# Patient Record
Sex: FEMALE | Race: WHITE | Employment: OTHER | URBAN - NONMETROPOLITAN AREA
[De-identification: names, ages, dates, MRNs, and addresses within clinical notes are randomized per-mention and may not be internally consistent; named-entity substitution may affect disease eponyms.]

---

## 2021-10-27 ENCOUNTER — OFFICE VISIT (OUTPATIENT)
Dept: ONCOLOGY | Age: 76
End: 2021-10-27
Payer: MEDICARE

## 2021-10-27 ENCOUNTER — HOSPITAL ENCOUNTER (OUTPATIENT)
Dept: INFUSION THERAPY | Age: 76
Discharge: HOME | End: 2021-10-27
Payer: MEDICARE

## 2021-10-27 VITALS
SYSTOLIC BLOOD PRESSURE: 141 MMHG | OXYGEN SATURATION: 98 % | DIASTOLIC BLOOD PRESSURE: 78 MMHG | HEART RATE: 94 BPM | TEMPERATURE: 99.4 F | BODY MASS INDEX: 33.39 KG/M2 | HEIGHT: 64 IN | RESPIRATION RATE: 20 BRPM | WEIGHT: 195.6 LBS

## 2021-10-27 DIAGNOSIS — C80.1 CARCINOMA (HCC): Primary | ICD-10-CM

## 2021-10-27 PROCEDURE — 1123F ACP DISCUSS/DSCN MKR DOCD: CPT | Performed by: INTERNAL MEDICINE

## 2021-10-27 PROCEDURE — 4040F PNEUMOC VAC/ADMIN/RCVD: CPT | Performed by: INTERNAL MEDICINE

## 2021-10-27 PROCEDURE — 99205 OFFICE O/P NEW HI 60 MIN: CPT | Performed by: INTERNAL MEDICINE

## 2021-10-27 PROCEDURE — 99211 OFF/OP EST MAY X REQ PHY/QHP: CPT

## 2021-10-27 PROCEDURE — 1036F TOBACCO NON-USER: CPT | Performed by: INTERNAL MEDICINE

## 2021-10-27 PROCEDURE — G8427 DOCREV CUR MEDS BY ELIG CLIN: HCPCS | Performed by: INTERNAL MEDICINE

## 2021-10-27 PROCEDURE — G8484 FLU IMMUNIZE NO ADMIN: HCPCS | Performed by: INTERNAL MEDICINE

## 2021-10-27 PROCEDURE — G8417 CALC BMI ABV UP PARAM F/U: HCPCS | Performed by: INTERNAL MEDICINE

## 2021-10-27 PROCEDURE — 1090F PRES/ABSN URINE INCON ASSESS: CPT | Performed by: INTERNAL MEDICINE

## 2021-10-27 PROCEDURE — G8400 PT W/DXA NO RESULTS DOC: HCPCS | Performed by: INTERNAL MEDICINE

## 2021-10-27 RX ORDER — CYCLOBENZAPRINE HCL 10 MG
10 TABLET ORAL NIGHTLY
COMMUNITY

## 2021-10-27 NOTE — PATIENT INSTRUCTIONS
1.  Please reconsult Dr. Claudette Reeves at [facility] for evaluation of surgical options for patient's malignancy. 2.  Please do ALK, EGFR, PD-L1, estrogen receptor, progesterone scepter, and HER-2/ashly on the patient's previous biopsy at [facility]. 3.  Foundation 1 analysis today. 4.  Return to clinic after the above have been completed.

## 2021-10-28 ENCOUNTER — TELEPHONE (OUTPATIENT)
Dept: ONCOLOGY | Age: 76
End: 2021-10-28

## 2021-11-09 ENCOUNTER — TELEPHONE (OUTPATIENT)
Dept: ONCOLOGY | Age: 76
End: 2021-11-09

## 2021-11-09 DIAGNOSIS — C80.1 CARCINOMA (HCC): Primary | ICD-10-CM

## 2021-11-12 NOTE — TELEPHONE ENCOUNTER
Schedule appointment to see radiation therapy and then see me after. Order for radiation therapy completed.

## 2021-11-22 NOTE — PROGRESS NOTES
[facility] CANCER CENTER  CANCER NETWORK OF [facility]  [facility] [address] [facility] [address]  [address], [facility] [address]  [address]  Dept: [phone]  Dept Fax: [phone]: [phone]     Encounter Date: 10/27/2021    Referring Physician:  Sylvia Lopez MD     Primary Provider: Addie Dias MD     Subjective:      Chief Complaint:  Lamar Travis is a 68 y.o. with lung cancer. HPI:  This is the first visit to the [facility] for this patient who was referred by Sylvia Lopez MD for evaluation of lung cancer. On September 10, 2021 the patient had a biopsy of a mass in the right upper lobe that was reported to be adenocarcinoma. In addition, a station 4R lymph node was also noted to be positive for malignancy with adenocarcinoma. The patient has had a PET/CT scan recently that confirmed the presence of the right upper lobe nodule abutting the mediastinum. There is also a right paratracheal lymph node concerning for malignancy on the PET/CT scan. The patient has previously met with Dr. Carmita Blanchard for evaluation and would like to meet with him again in the near future. I reviewed with the patient the role of chemotherapy treatment with the possibility of radiation therapy. Foundation 1 analysis as well as ALK, EGFR, and PD-L1 analysis will be completed on her malignancy. The patient is a chronic smoker with a history of heavy tobacco use. Multiple questions were answered throughout the course the consultation. By the end of the consultation, all the patient's questions had been answered. The patient was asked to call if there were any questions or concerns prior to the next scheduled clinic visit. Past Medical History  She  has a past medical history of Arthritis, Chronic back pain, Emphysema of lung ([address] and Hyperlipidemia.     Surgical History  She  has a past surgical history that includes Tonsillectomy; Foot surgery; and Cholecystectomy. Home Medications  She has a current medication list which includes the following prescription(s): cyclobenzaprine, calcium carbonate-vit d-min, senna, albuterol sulfate hfa, and simvastatin. Allergies  Allergies   Allergen Reactions    Motrin [Ibuprofen] Itching     Social History  She  reports that she has quit smoking. Her smoking use included cigarettes. She has a 78.00 pack-year smoking history. She has never used smokeless tobacco. She reports that she does not drink alcohol and does not use drugs. Family History  Her family history includes Asthma in her brother; Breast Cancer in her maternal grandmother; Cancer in an other family member; Diabetes in her mother, sister, and another family member; Heart Attack in her sister; Heart Disease in her sister and another family member; High Blood Pressure in an other family member. Review of Systems  Constitutional: Fatigue. HENT: Negative. Eyes: Negative. Respiratory: Cough, shortness of breath. Cardiovascular: Negative. Gastrointestinal: Negative. Genitourinary: Negative. Musculoskeletal: Muscle weakness, muscle pain, osteoporosis. Skin: Itching. Neurological: General weakness. Hematological: Negative. Psychiatric/Behavioral: Negative. Objective:   Physical Exam  Vitals:    10/27/21 1338   BP: (!) 141/78   Pulse: 94   Resp: 20   Temp: 99.4 °F (37.4 °C)   SpO2: 98%   Vitals reviewed and are stable. Constitutional: Elderly. No acute distress. HENT: Normocephalic and atraumatic. Eyes: Pupils appear equal. No scleral icterus. Neck: Bilateral appearance is symmetrical. No identifiable masses. Chest: Inspection and palpation of chest is normal.  Pulmonary: Effort normal. No respiratory distress. Cardiovascular: Nn edema in any of the four extremities. Abdominal: Soft. Bowel sounds present. No hepatomegaly or splenomegaly.    Musculoskeletal: Gait is abnormal. Muscle strength and tone grossly decreased. Neurological: Alert and oriented to person, place, and time. Judgment and thought content normal.  Skin: Scattered ecchymosis noted on bilateral upper forearms. Psychiatric: Mood and affect appropriate for the clinical situation. .     Assessment:   1. Adenocarcinoma of the lung. Plan:   1. Please re consult Dr. Lazaro Sky at [facility] for evaluation of surgical options for patient's malignancy. 2.  Please do ALK, EGFR, PD-L1, estrogen receptor, progesterone scepter, and HER-2/ashly on the patient's previous biopsy at [facility]. 3.  Foundation 1 analysis today. 4.  Return to clinic after the above have been completed. I had an sixty minute consultation with this patient and family members. The majority of this time was spent in direct face to face discussion regarding the diagnosis of malignancy, review of the radiographic studies, review of the surgical pathology, the overall prognosis and review of treatment options. Lilliam Norris M.D. Medical Director: [facility]  Cancer Network [address] [facility], [address] [place] [address], [address] [place] [address], [address] [place] [address] of the [facility] at the [facility]      **This report has been created using voice recognition software. It may contain minor errors which are inherent in voice recognition technology. **

## 2021-11-23 ENCOUNTER — HOSPITAL ENCOUNTER (OUTPATIENT)
Dept: RADIATION ONCOLOGY | Age: 76
Discharge: HOME | End: 2021-11-23

## 2021-11-23 ENCOUNTER — HOSPITAL ENCOUNTER (OUTPATIENT)
Dept: NUCLEAR MEDICINE | Age: 76
Discharge: HOME | End: 2021-11-23

## 2021-11-23 ENCOUNTER — TELEPHONE (OUTPATIENT)
Dept: ONCOLOGY | Age: 76
End: 2021-11-23

## 2021-11-23 VITALS
HEIGHT: 64 IN | OXYGEN SATURATION: 93 % | WEIGHT: 190.92 LBS | SYSTOLIC BLOOD PRESSURE: 136 MMHG | DIASTOLIC BLOOD PRESSURE: 78 MMHG | BODY MASS INDEX: 32.59 KG/M2 | HEART RATE: 113 BPM | TEMPERATURE: 97 F | RESPIRATION RATE: 20 BRPM

## 2021-11-23 DIAGNOSIS — C34.11 MALIGNANT NEOPLASM OF UPPER LOBE OF RIGHT LUNG (HCC): ICD-10-CM

## 2021-11-23 DIAGNOSIS — Z00.6 EXAMINATION FOR NORMAL COMPARISON FOR CLINICAL RESEARCH: ICD-10-CM

## 2021-11-23 PROCEDURE — 99204 OFFICE O/P NEW MOD 45 MIN: CPT | Performed by: RADIOLOGY

## 2021-11-23 NOTE — PROGRESS NOTES
Radiation Oncology Consultation  Encounter Date: 2021     Ms. Disha Mckeon is a 68 y.o. female  : 1945  MRN: [id number]  [facility]  Requesting Provider: Dr. Rosemarie Brian    Reason for request: Lung cancer      CONSULTANT: Maryana Yo MD      DIAGNOSIS: C34.11 -- Malignant neoplasm of upper lobe right bronchus or lung; Adenocarcinoma; T1b N2 M0, Stage IIIA [T1b based on size, could be considered T2 due to visceral pleural invasion]  Date of diagnosis: 9/10/2021      ASSESSMENT:  1. Stage IIIA non-small cell lung cancer as described above. The T-stage could be argued as T1b due to the size, but visceral pleural invasion would qualify as T2.  2. The diagnosis, including AJCC staging so far as it is currently understood was reviewed. Cheri received a copy of her AJCC staging information. Treatment options and recommendations, including current clinical practice guidelines (NCCN) were reviewed in detail. We reviewed recommendations for the initial diagnostic work-up, pathologic evaluation And treatment. For stage IIIA disease with N2 node involvement, the preferred management is definitive combined chemoradiotherapy. It is unclear whether Hilario Yadav will be a candidate for chemotherapy given her health limitations. I have some concern about her ability to lie on the treatment table because of her significant musculoskeletal pain, but she and I believe this is feasible if she is allowed to use a pad (thicker than the standard 1 week commonly used) on the simulator and treatment tables. We reviewed recommendations for surveillance/follow-up after completion of initial therapy. Hilario Yadav received a copy of the clinical practice guideline pertaining to her situation. 3. We reviewed various aspects of radiation therapy.   This included discussion of the nature/mode of action of external beam radiation therapy, multiple steps involved in set up/simulation, planning, initiation and performance of the treatment. We reviewed logistics including expected number of daily treatments and amount of time usually spent in the department for each treatment. Jorge Arzate has familiarity with radiation therapy, since her son is a radiation therapy technologist and indeed used to be the chief tach in this department. We reviewed expected and potential short and long-term side effects and risks of the treatment. The main acute risk is esophagitis with associated pain. Because this is a very centrally located cancer, the amount of pulmonary fibrosis after treatment should be minimal.  4. The etiology of Cheri's diffuse pain is unclear. She says the pain has been markedly more noticeable in the last 6 months. I am questioning whether there may be some paraneoplastic syndrome that is contributing. If so, then the best management is to treat the cancer. 11. Jorge Arzate had the opportunity to ask questions, and indicated that her questions were satisfactorily addressed. She also indicated that she understood the discussion. She does wish to proceed with radiation therapy. She is waiting to speak with Dr. Maranda Colorado regarding his recommendations about chemotherapy. PLAN:  1. Schedule CT simulation  2. Await Dr. Clifford Spurling' determination regarding systemic therapy  3. Schedule nursing teaching  4. Initiate RT after completion of therapy planning (in conjunction with systemic therapy if applicable  5. Continue care with other physicians/providers  6. Continue surveillance and basic/preventive/supportive health care in accordance with clinical practice guidelines. HISTORY OF PRESENT ILLNESS:   Emily Lim is a 49-year-old woman with a history of cigarette smoking and resultant COPD. She has been under surveillance for an abdominal aortic aneurism. In February 2021, there was incidental note of a mixed density lesion in the left hepatic lobe, felt likely to represent a hemangioma.   In April 2021, she presented with complaint of fever and abdominal pain. She underwent CT scanning Of the abdomen and pelvis which was stable. On work-up, she was found to have an elevated D-dimer. CT angiography of the thorax was obtained, And showed some infectious/inflammatory changes but also a 13 mm spiculated nodule in the medial right upper lobe. Because of her risk related to the smoking history, PET scan was recommended and was performed in April 2021 showed some mild uptake associated with the index lesion and some adjacent mediastinal lymphadenopathy. Initial bronchoscopic evaluation was nondiagnostic. Follow-up CT scan in July 2021 showed mild enlargement of the right upper lobe mass. Cheri then underwent additional evaluation. Bronchoscopy/EBUS was performed 9/10/2021 with biopsy of the lung nodule and a lymph node in the mediastinum at station 4R. Both of these returned positive for malignancy consistent with adenocarcinoma of lung origin. Follow-up PET scan in October 2021 again showed the concerning areas known to be cancer, with some increased in metabolic activity. Lu Pierre is not considered a suitable surgical candidate due to her overall performance status. She was referred to medical oncology for evaluation, and is being seen today 11/23/2021 for evaluation and discussion regarding the potential role of radiation therapy in the management of her stage III non-small cell lung cancer.       Past Medical History:   Diagnosis Date    Arthritis     Chronic back pain     Emphysema of lung ([address]     Hyperlipidemia         Past Surgical History:   Procedure Laterality Date    CHOLECYSTECTOMY      FOOT SURGERY      x4    TONSILLECTOMY         Family History   Problem Relation Age of Onset    Diabetes Other     High Blood Pressure Other     Heart Disease Other     Cancer Other     Diabetes Mother     Diabetes Sister     Heart Disease Sister     Heart Attack Sister     Breast Cancer Maternal Grandmother     Asthma Brother        Social History     Socioeconomic History    Marital status:      Spouse name: Not on file    Number of children: Not on file    Years of education: Not on file    Highest education level: Not on file   Occupational History    Not on file   Tobacco Use    Smoking status: Former Smoker     Packs/day: 1.50     Years: 52.00     Pack years: 78.00     Types: Cigarettes    Smokeless tobacco: Never Used   Substance and Sexual Activity    Alcohol use: No    Drug use: No    Sexual activity: Not on file   Other Topics Concern    Not on file   Social History Narrative    Not on file     Social Determinants of Health     Financial Resource Strain:     Difficulty of Paying Living Expenses: Not on file   Food Insecurity:     Worried About [address] in the Last Year: Not on file    [address] RxResults in the Last Year: Not on file   Transportation Needs:     Lack of Transportation (Medical): Not on file    Lack of Transportation (Non-Medical):  Not on file   Physical Activity:     Days of Exercise per Week: Not on file    Minutes of Exercise per Session: Not on file   Stress:     Feeling of Stress : Not on file   Social Connections:     Frequency of Communication with Friends and Family: Not on file    Frequency of Social Gatherings with Friends and Family: Not on file    Attends Anabaptist Services: Not on file    Active Member of [address] [place] [address] [employer] or Organizations: Not on file    Attends Club or Organization Meetings: Not on file    Marital Status: Not on file   Intimate Partner Violence:     Fear of Current or Ex-Partner: Not on file    Emotionally Abused: Not on file    Physically Abused: Not on file    Sexually Abused: Not on file   Housing Stability:     Unable to Pay for Housing in the Last Year: Not on file    Number of [place] in the Last Year: Not on file    Unstable Housing in the Last Year: Not on file     Exposure to Industrial/ environmental Carcinogens: no      ALLERGIES:   Allergies   Allergen Reactions    Motrin [Ibuprofen] Itching          Current Outpatient Medications   Medication Sig Dispense Refill    cyclobenzaprine (FLEXERIL) 10 MG tablet Take 10 mg by mouth nightly      Calcium Carbonate-Vit D-Min (CALCIUM 1200 PO) Take by mouth daily      SENNA PO Take by mouth three times daily      albuterol (PROVENTIL HFA;VENTOLIN HFA) 108 (90 BASE) MCG/ACT inhaler Inhale 2 puffs into the lungs nightly      simvastatin (ZOCOR) 20 MG tablet Take 40 mg by mouth nightly        No current facility-administered medications for this encounter.      No outpatient medications have been marked as taking for the 11/23/21 encounter [facility] Encounter) with Keli Owusu MD.          LABORATORY STUDIES:    4/20/2021: CBC with Diff:  WBC (WHITE BLOOD COUNT) 3.4 - 9.6 K/cmm 7.0    RBC 3.99 - 5.18 M/cmm 3.86 Low     HEMOGLOBIN (HGB) 11.7 - 15.5 g/dl 12.6    HEMATOCRIT (HCT) 35.6 - 47.0 % 39.3    MEAN CELL VOLUME 82.0 - 98.2 fl 101.8 High     Mean Cell HGB 26.8 - 32.5 pg 32.6 High     MEAN CELL HGB CONCENTRATION 31.2 - 34.6 % 32.1    RBC DISTRIBUTION 11.4 - 14.9 % 13.6    PLATELET COUNT 670 - 358 thousand 178    MEAN PLATELET VOLUME 8.8 - 12.0 fl 10.7    NEUTROPHILS 37.0 - 71.0 % 54.5    LYMPHOCYTES % 18.0 - 48.0 % 31.6    MONOCYTE % 4.0 - 14.0 % 7.3    EOSINOPHILS 0.0 - 6.0 % 6.0    BASOPHIL % 0.0 - 1.0 % 0.3    IG% 0.00 - 0.37 % 0.30    NEUTROPHILS ABS# AUTO 1.0 - 6.1 K/cmm 3.8    LYMPHOCYTES, ABSOLUTE 1.0 - 3.2 K/cmm 2.2    MONOCYTES, ABSOLUTE 0.3 - 0.8 K/cmm 0.5    EOSINOPHILS, ABSOLUTE 0.0 - 0.4 K/cmm 0.4    BASOPHILS, ABSOLUTE 0.0 - 0.1 K/cmm 0.0    IG# 0.00 - 0.30 K/cmm 0.02      6/35/6138: Basic metabolic panel:  GLUCOSE 74 - 106 mg/dL 93    BUN 7 - 18 mg/dL 7    CREATININE SERUM 0.52 - 1.04 mg/dL 0.70    SODIUM 137 - 145 mmol/L 139    POTASSIUM 3.5 - 5.1 mmol/L 3.8    CHLORIDE 98 - 107 mmol/L 102    CARBON DIOXIDE (CO2) 22 - 30 mmol/L 30    CALCIUM 8.4 - 10.2 mg/dL 8.9    BUN/CREA RATIO Ratio 10.0    ANION GAP 9 - 20 mmol/L 11    ESTIMATED GFR, NON  AMER mL/min >60          PATHOLOGY:   9/10/2021: Surgical pathology:            RADIOLOGIC STUDIES:   2021: CT angiography chest:        2021: PET/CT skull base mid-thigh:        2021: CT chest without contrast:        10/22/2021: PET/CT skull base mid-thigh:        REVIEW OF SYSTEMS:    Constitutional: Denies fever, chills, unintentional weight change. HEENT: Denies hearing or vision change. Denies dysphagia or odynophagia. Respiratory: Denies worsening dyspnea. Denies hemoptysis, coughing, wheezing or sputum production. Cardiovascular: Chest pain, palpitations, syncope. GI: Denies nausea or vomiting, hematemesis or rectal bleeding. Denies change in bowel habits. : Denies hematuria or dysuria. Musculoskeletal: Diffuse/multifocal incapacitating musculoskeletal pain, especially last 6 months  Extremities: Limited ROM due to pain and generalized weakness  Metabolic/endocrine: Denies new complaints  Neurological: Denies seizures, fainting or tremors. Integument: Denies rashes or ulcerations. PHYSICAL EXAMINATION:   VITAL SIGNS: Height: 5 feet 4 inches. Weight: 190 pounds 14.7 ounces. Temperature: 36.1 Celsius. Pulse: 113. Respirations: 20. Blood pressure: 136/78. Pulse oximetry: O2 saturation 93% on room air. ECOG PERFORMANCE STATUS: 1  PAIN RATIN-9/10 (Multifocal musculoskeletal pain)  GENERAL: Pleasant well-developed But ill-appearing adult Female. In Obvious pain but no acute distress. Alert and oriented ×3; clear mentation with appropriate affect  SKIN: Warm and dry, without jaundice, ecchymoses, or petechiae. HEENT: Normocephalic, atraumatic. PERRL/EOMI; ears, nose and lips unremarkable on external examination; oral cavity/oropharyngeal mucosa moist without lesion or exudate  NECK:  No JVD; no palpable cervical adenopathy.   THORAX: No palpable supraclavicular or axillary adenopathy;  LUNGS: With distant breath sounds, bilateral rhonchi, Basilar crackles. CARDIAC: Mildly tachycardic  ABDOMEN: Soft, nontender, bowel sounds present  EXTREMITIES: No clubbing or cyanosis. Some muscle wasting evident. NEUROLOGIC: No grossly apparent focal deficits. Cranial nerves grossly intact      Thank you for allowing my assistance in 1 Kaia Dumont MD   Radiation Oncology     ATTESTATION:50 minutes face-to-face time,  >50% spent in counseling/discussion/education. CC: Juan Manuel Perez; Laxmi Louise; Nikole Kaba  ACC: Tumor Registry: [address] [address] and [facility]      This document was created using a voice-recognition program.  Computer generated transcription errors may be present.

## 2021-11-24 PROCEDURE — 77290 THER RAD SIMULAJ FIELD CPLX: CPT | Performed by: RADIOLOGY

## 2021-11-24 PROCEDURE — 77334 RADIATION TREATMENT AID(S): CPT | Performed by: RADIOLOGY

## 2021-11-24 PROCEDURE — 77263 THER RADIOLOGY TX PLNG CPLX: CPT | Performed by: RADIOLOGY

## 2021-11-29 ENCOUNTER — HOSPITAL ENCOUNTER (OUTPATIENT)
Dept: INFUSION THERAPY | Age: 76
Discharge: HOME | End: 2021-11-29
Payer: MEDICARE

## 2021-11-29 ENCOUNTER — OFFICE VISIT (OUTPATIENT)
Dept: ONCOLOGY | Age: 76
End: 2021-11-29
Payer: MEDICARE

## 2021-11-29 VITALS
HEIGHT: 64 IN | OXYGEN SATURATION: 94 % | DIASTOLIC BLOOD PRESSURE: 67 MMHG | RESPIRATION RATE: 18 BRPM | SYSTOLIC BLOOD PRESSURE: 137 MMHG | TEMPERATURE: 98.9 F | BODY MASS INDEX: 32.44 KG/M2 | WEIGHT: 190 LBS | HEART RATE: 89 BPM

## 2021-11-29 DIAGNOSIS — C34.11 MALIGNANT NEOPLASM OF UPPER LOBE OF RIGHT LUNG (HCC): ICD-10-CM

## 2021-11-29 DIAGNOSIS — C80.1 CARCINOMA (HCC): Primary | ICD-10-CM

## 2021-11-29 PROCEDURE — 99214 OFFICE O/P EST MOD 30 MIN: CPT | Performed by: INTERNAL MEDICINE

## 2021-11-29 PROCEDURE — G8427 DOCREV CUR MEDS BY ELIG CLIN: HCPCS | Performed by: INTERNAL MEDICINE

## 2021-11-29 PROCEDURE — 1090F PRES/ABSN URINE INCON ASSESS: CPT | Performed by: INTERNAL MEDICINE

## 2021-11-29 PROCEDURE — 1036F TOBACCO NON-USER: CPT | Performed by: INTERNAL MEDICINE

## 2021-11-29 PROCEDURE — 1123F ACP DISCUSS/DSCN MKR DOCD: CPT | Performed by: INTERNAL MEDICINE

## 2021-11-29 PROCEDURE — G8400 PT W/DXA NO RESULTS DOC: HCPCS | Performed by: INTERNAL MEDICINE

## 2021-11-29 PROCEDURE — 99211 OFF/OP EST MAY X REQ PHY/QHP: CPT

## 2021-11-29 PROCEDURE — G8484 FLU IMMUNIZE NO ADMIN: HCPCS | Performed by: INTERNAL MEDICINE

## 2021-11-29 PROCEDURE — G8417 CALC BMI ABV UP PARAM F/U: HCPCS | Performed by: INTERNAL MEDICINE

## 2021-11-29 PROCEDURE — 4040F PNEUMOC VAC/ADMIN/RCVD: CPT | Performed by: INTERNAL MEDICINE

## 2021-11-29 NOTE — PATIENT INSTRUCTIONS
1.  Patient is going to proceed with radiation therapy. Return to clinic to see me 2 to 3 weeks after radiation therapy has been completed.

## 2021-11-29 NOTE — PROGRESS NOTES
/  [facility] [address] [facility] [address]  [address] [facility] [address]  Dept: [phone]  Dept Fax: [phone]: [phone]     Encounter Date:  11/29/2021    Primary Provider: Kwame Galeana MD     Subjective:      Chief Complaint:  Mehrdad Schultz is a 68 y.o. with lung cancer. On September 10, 2021 the patient had a biopsy of a mass in the right upper lobe that was reported to be adenocarcinoma. In addition, a station 4R lymph node was also noted to be positive for malignancy with adenocarcinoma. The patient has had a PET/CT scan recently that confirmed the presence of the right upper lobe nodule abutting the mediastinum. There is also a right paratracheal lymph node concerning for malignancy on the PET/CT scan. The patient has previously met with Dr. Merle Schirmer for evaluation and would like to meet with him again in the near future. HPI:  The patient is here today for follow up evaluation. She has a history of lung cancer. Foundation 1 analysis did not find any evidence of target mutation associated with therapy. PDL-1 score was 0. ALK, ROS1, and EGFR analysis was negative for mutations. These results were reviewed with the patient today. Options of therapy would be consider radiation, chemotherapy or combination of both radiation and chemotherapy treatment. Patient does not want to consider chemotherapy as a treatment option at this time. She is willing to consider radiation therapy as an option. The patient's general ECOG performance status is level 2. She complains of generalized weakness, fatigue, chronic pain, anorexia, and weight loss. Her bowel bladder habits have been stable. PMH, SH, and FH:  I reviewed the patients medication list and allergy list as noted on the electronic medical record.  The PMH, SH and FH were also reviewed as noted on the

## 2021-11-30 PROCEDURE — 77338 DESIGN MLC DEVICE FOR IMRT: CPT | Performed by: RADIOLOGY

## 2021-11-30 PROCEDURE — 77301 RADIOTHERAPY DOSE PLAN IMRT: CPT | Performed by: RADIOLOGY

## 2021-11-30 PROCEDURE — 77300 RADIATION THERAPY DOSE PLAN: CPT | Performed by: RADIOLOGY

## 2021-12-02 PROCEDURE — 77014 PR CT GUIDANCE PLACEMENT RAD THERAPY FIELDS: CPT | Performed by: RADIOLOGY

## 2021-12-03 PROCEDURE — 77014 PR CT GUIDANCE PLACEMENT RAD THERAPY FIELDS: CPT | Performed by: RADIOLOGY

## 2021-12-07 PROCEDURE — 77014 PR CT GUIDANCE PLACEMENT RAD THERAPY FIELDS: CPT | Performed by: RADIOLOGY

## 2021-12-08 PROCEDURE — 77014 PR CT GUIDANCE PLACEMENT RAD THERAPY FIELDS: CPT | Performed by: RADIOLOGY

## 2021-12-08 PROCEDURE — 99999 PR OFFICE/OUTPT VISIT,PROCEDURE ONLY: CPT | Performed by: RADIOLOGY

## 2021-12-09 PROCEDURE — 77014 PR CT GUIDANCE PLACEMENT RAD THERAPY FIELDS: CPT | Performed by: RADIOLOGY

## 2021-12-09 PROCEDURE — 77427 RADIATION TX MANAGEMENT X5: CPT | Performed by: RADIOLOGY

## 2021-12-10 PROCEDURE — 77014 PR CT GUIDANCE PLACEMENT RAD THERAPY FIELDS: CPT | Performed by: RADIOLOGY

## 2021-12-14 PROCEDURE — 77014 PR CT GUIDANCE PLACEMENT RAD THERAPY FIELDS: CPT | Performed by: RADIOLOGY

## 2021-12-15 PROCEDURE — 77014 PR CT GUIDANCE PLACEMENT RAD THERAPY FIELDS: CPT | Performed by: RADIOLOGY

## 2021-12-16 PROCEDURE — 77014 PR CT GUIDANCE PLACEMENT RAD THERAPY FIELDS: CPT | Performed by: RADIOLOGY

## 2021-12-17 PROCEDURE — 77427 RADIATION TX MANAGEMENT X5: CPT | Performed by: RADIOLOGY

## 2021-12-17 PROCEDURE — 77014 PR CT GUIDANCE PLACEMENT RAD THERAPY FIELDS: CPT | Performed by: RADIOLOGY

## 2021-12-20 PROCEDURE — 77014 PR CT GUIDANCE PLACEMENT RAD THERAPY FIELDS: CPT | Performed by: RADIOLOGY

## 2021-12-21 PROCEDURE — 77014 PR CT GUIDANCE PLACEMENT RAD THERAPY FIELDS: CPT | Performed by: RADIOLOGY

## 2021-12-22 PROCEDURE — 77014 PR CT GUIDANCE PLACEMENT RAD THERAPY FIELDS: CPT | Performed by: RADIOLOGY

## 2021-12-23 PROCEDURE — 77014 PR CT GUIDANCE PLACEMENT RAD THERAPY FIELDS: CPT | Performed by: RADIOLOGY

## 2021-12-28 PROCEDURE — 77014 PR CT GUIDANCE PLACEMENT RAD THERAPY FIELDS: CPT | Performed by: RADIOLOGY

## 2021-12-28 PROCEDURE — 77427 RADIATION TX MANAGEMENT X5: CPT | Performed by: RADIOLOGY

## 2021-12-29 PROCEDURE — 77014 PR CT GUIDANCE PLACEMENT RAD THERAPY FIELDS: CPT | Performed by: RADIOLOGY

## 2021-12-30 PROCEDURE — 77014 PR CT GUIDANCE PLACEMENT RAD THERAPY FIELDS: CPT | Performed by: RADIOLOGY

## 2022-01-04 PROCEDURE — 77014 PR CT GUIDANCE PLACEMENT RAD THERAPY FIELDS: CPT | Performed by: RADIOLOGY

## 2022-01-05 PROCEDURE — 77014 PR CT GUIDANCE PLACEMENT RAD THERAPY FIELDS: CPT | Performed by: RADIOLOGY

## 2022-01-06 PROCEDURE — 77014 PR CT GUIDANCE PLACEMENT RAD THERAPY FIELDS: CPT | Performed by: RADIOLOGY

## 2022-01-06 PROCEDURE — 77427 RADIATION TX MANAGEMENT X5: CPT | Performed by: RADIOLOGY

## 2022-01-06 PROCEDURE — 99999 PR OFFICE/OUTPT VISIT,PROCEDURE ONLY: CPT | Performed by: RADIOLOGY

## 2022-01-07 PROCEDURE — 77014 PR CT GUIDANCE PLACEMENT RAD THERAPY FIELDS: CPT | Performed by: RADIOLOGY

## 2022-01-10 PROCEDURE — 77014 PR CT GUIDANCE PLACEMENT RAD THERAPY FIELDS: CPT | Performed by: RADIOLOGY

## 2022-01-11 PROCEDURE — 77014 PR CT GUIDANCE PLACEMENT RAD THERAPY FIELDS: CPT | Performed by: RADIOLOGY

## 2022-01-12 PROCEDURE — 77014 PR CT GUIDANCE PLACEMENT RAD THERAPY FIELDS: CPT | Performed by: RADIOLOGY

## 2022-01-13 PROCEDURE — 77427 RADIATION TX MANAGEMENT X5: CPT | Performed by: RADIOLOGY

## 2022-01-13 PROCEDURE — 77014 PR CT GUIDANCE PLACEMENT RAD THERAPY FIELDS: CPT | Performed by: RADIOLOGY

## 2022-01-14 PROCEDURE — 77014 PR CT GUIDANCE PLACEMENT RAD THERAPY FIELDS: CPT | Performed by: RADIOLOGY

## 2022-01-18 PROCEDURE — 77014 PR CT GUIDANCE PLACEMENT RAD THERAPY FIELDS: CPT | Performed by: RADIOLOGY

## 2022-01-19 PROCEDURE — 77014 PR CT GUIDANCE PLACEMENT RAD THERAPY FIELDS: CPT | Performed by: RADIOLOGY

## 2022-01-20 PROCEDURE — 77014 PR CT GUIDANCE PLACEMENT RAD THERAPY FIELDS: CPT | Performed by: RADIOLOGY

## 2022-01-21 PROCEDURE — 77014 PR CT GUIDANCE PLACEMENT RAD THERAPY FIELDS: CPT | Performed by: RADIOLOGY

## 2022-01-21 PROCEDURE — 77427 RADIATION TX MANAGEMENT X5: CPT | Performed by: RADIOLOGY

## 2022-01-24 PROCEDURE — 77014 PR CT GUIDANCE PLACEMENT RAD THERAPY FIELDS: CPT | Performed by: RADIOLOGY

## 2022-01-25 PROCEDURE — 77014 PR CT GUIDANCE PLACEMENT RAD THERAPY FIELDS: CPT | Performed by: RADIOLOGY

## 2022-01-26 PROCEDURE — 77014 PR CT GUIDANCE PLACEMENT RAD THERAPY FIELDS: CPT | Performed by: RADIOLOGY

## 2022-01-27 PROCEDURE — 77014 PR CT GUIDANCE PLACEMENT RAD THERAPY FIELDS: CPT | Performed by: RADIOLOGY

## 2022-01-27 PROCEDURE — 77427 RADIATION TX MANAGEMENT X5: CPT | Performed by: RADIOLOGY

## 2022-02-10 NOTE — PROGRESS NOTES
/  [facility] [address] [facility] [address], [facility] [address] [facility] [address]  Dept: [phone]  Dept Fax: [phone]: [phone]     Encounter Date:  02/14/2022    Primary Provider: Anup Méndez MD     Subjective:      Chief Complaint:  Ubaldo Arizmendi is a 68 y.o. with lung cancer. On September 10, 2021, the patient had a biopsy of a mass in the right upper lobe that was reported to be adenocarcinoma. In addition, a station 4R lymph node was also noted to be positive for malignancy with adenocarcinoma. The patient has had a PET/CT scan recently that confirmed the presence of the right upper lobe nodule abutting the mediastinum. There is also a right paratracheal lymph node concerning for malignancy on the PET/CT scan. The patient has previously met with Dr. Ari Kruse for evaluation and was  determined to not be a surgical candidate. Foundation 1 analysis did not find any evidence of target mutation associated with therapy. PDL-1 score was 0. ALK, ROS1, and EGFR analysis was negative for mutations. HPI:   The patient is here today for follow-up regarding her history of adenocarcinoma involving the lung. She has received radiation therapy treatment but elected not to have concurrent chemotherapy treatment given by her choice. On today's evaluation she states that she has chronic shortness of breath, fatigue, generalized weakness. However those have been improving since completing radiation therapy treatment. She has not had fever or other signs of infection. Her bowel and bladder habits have been fairly stable. ECOG performance status is level 1-2. PMH, SH, and FH:  I reviewed the patients medication list and allergy list as noted on the electronic medical record. The PMH, SH and FH were also reviewed as noted on the EMR.     Review of Systems  Constitutional: Fatigue. HENT: Negative. Eyes: Negative. Respiratory: Shortness of breath. Cardiovascular: Negative. Gastrointestinal: Negative. Genitourinary: Negative. Musculoskeletal: Chronic pain. Skin: Negative. Neurological: General weakness. Hematological: Negative. Psychiatric/Behavioral: Negative. Objective:   Physical Exam  Vitals:    02/14/22 1126   BP: 133/85   Pulse: 88   Resp: 18   Temp: 98.9 °F (37.2 °C)   SpO2: 92%   Vitals reviewed and are stable. Constitutional: Elderly. No acute distress. HENT: Normocephalic and atraumatic. Eyes: Pupils appear equal. No scleral icterus. Pulmonary: Effort normal. No respiratory distress. Musculoskeletal: Gait is abnormal. Muscle strength and tone grossly decreased. Neurological: Alert and oriented to person, place, and time. Judgment and thought content normal.   Psychiatric: Mood and affect appropriate for the clinical situation. Data Analysis: The following laboratory studies were reviewed with the patient today:    Hematology 2/14/2022   WBC 4.9   RBC 4.51   HGB 14.6   HCT 45.3    (H)   RDW 15.3 (H)        Assessment;   1. Adenocarcinoma of the lung -follow-up with radiation oncology as scheduled. Plan:   1. Monitor for recurrence of malignancy. 2.  CT scans prior to RTC. Nereyda Monday M.D. Medical Director: [facility]  Cancer [facility]  [address] [facility], [address] [place] [address], [address] [place] [address], [address] [place] [address] of the [facility] at the [facility]      **This report has been created using voice recognition software. It may contain minor errors which are inherent in voice recognition technology. ** 04-Sep-2021

## 2022-02-14 ENCOUNTER — OFFICE VISIT (OUTPATIENT)
Dept: ONCOLOGY | Age: 77
End: 2022-02-14
Payer: MEDICARE

## 2022-02-14 ENCOUNTER — HOSPITAL ENCOUNTER (OUTPATIENT)
Dept: INFUSION THERAPY | Age: 77
Discharge: HOME | End: 2022-02-14
Payer: MEDICARE

## 2022-02-14 VITALS
RESPIRATION RATE: 18 BRPM | SYSTOLIC BLOOD PRESSURE: 133 MMHG | HEART RATE: 88 BPM | DIASTOLIC BLOOD PRESSURE: 85 MMHG | TEMPERATURE: 98.9 F

## 2022-02-14 VITALS
OXYGEN SATURATION: 92 % | SYSTOLIC BLOOD PRESSURE: 133 MMHG | TEMPERATURE: 98.9 F | HEART RATE: 88 BPM | RESPIRATION RATE: 18 BRPM | WEIGHT: 190 LBS | HEIGHT: 64 IN | BODY MASS INDEX: 32.44 KG/M2 | DIASTOLIC BLOOD PRESSURE: 85 MMHG

## 2022-02-14 DIAGNOSIS — C80.1 CARCINOMA (HCC): ICD-10-CM

## 2022-02-14 DIAGNOSIS — C34.11 MALIGNANT NEOPLASM OF UPPER LOBE OF RIGHT LUNG (HCC): ICD-10-CM

## 2022-02-14 DIAGNOSIS — C80.1 CARCINOMA (HCC): Primary | ICD-10-CM

## 2022-02-14 LAB
ABSOLUTE IMMATURE GRANULOCYTE: 0.01 THOU/MM3 (ref 0–0.07)
ALBUMIN SERPL-MCNC: 3.7 G/DL (ref 3.5–5.1)
ALP BLD-CCNC: 90 U/L (ref 38–126)
ALT SERPL-CCNC: 7 U/L (ref 11–66)
AST SERPL-CCNC: 15 U/L (ref 5–40)
BASINOPHIL, AUTOMATED: 1 % (ref 0–3)
BASOPHILS ABSOLUTE: 0 THOU/MM3 (ref 0–0.1)
BILIRUB SERPL-MCNC: 0.4 MG/DL (ref 0.3–1.2)
BILIRUBIN DIRECT: < 0.2 MG/DL (ref 0–0.3)
BUN, WHOLE BLOOD: 12 MG/DL (ref 8–26)
CHLORIDE, WHOLE BLOOD: 97 MEQ/L (ref 98–109)
CREATININE, WHOLE BLOOD: 1 MG/DL (ref 0.5–1.2)
EOSINOPHILS ABSOLUTE: 0.3 THOU/MM3 (ref 0–0.4)
EOSINOPHILS RELATIVE PERCENT: 7 % (ref 0–4)
GFR, ESTIMATED ,CON: 57 ML/MIN/1.73M2
GLUCOSE, WHOLE BLOOD: 107 MG/DL (ref 70–108)
HCT VFR BLD CALC: 45.3 % (ref 37–47)
HEMOGLOBIN: 14.6 GM/DL (ref 12–16)
IMMATURE GRANULOCYTES: 0 %
IONIZED CALCIUM, WHOLE BLOOD: 1.13 MMOL/L (ref 1.12–1.32)
LYMPHOCYTES # BLD: 14 % (ref 15–47)
LYMPHOCYTES ABSOLUTE: 0.7 THOU/MM3 (ref 1–4.8)
MCH RBC QN AUTO: 32.4 PG (ref 26–33)
MCHC RBC AUTO-ENTMCNC: 32.2 GM/DL (ref 32.2–35.5)
MCV RBC AUTO: 100 FL (ref 81–99)
MONOCYTES ABSOLUTE: 0.2 THOU/MM3 (ref 0.4–1.3)
MONOCYTES: 5 % (ref 0–12)
PDW BLD-RTO: 15.3 % (ref 11.5–14.5)
PLATELET # BLD: 155 THOU/MM3 (ref 130–400)
PMV BLD AUTO: 10 FL (ref 9.4–12.4)
POTASSIUM, WHOLE BLOOD: 5.2 MEQ/L (ref 3.5–4.9)
RBC # BLD: 4.51 MILL/MM3 (ref 4.2–5.4)
SEG NEUTROPHILS: 73 % (ref 43–75)
SEGMENTED NEUTROPHILS ABSOLUTE COUNT: 3.6 THOU/MM3 (ref 1.8–7.7)
SODIUM, WHOLE BLOOD: 136 MEQ/L (ref 138–146)
TOTAL CO2, WHOLE BLOOD: 31 MEQ/L (ref 23–33)
TOTAL PROTEIN: 7.7 G/DL (ref 6.1–8)
WBC # BLD: 4.9 THOU/MM3 (ref 4.8–10.8)

## 2022-02-14 PROCEDURE — G8400 PT W/DXA NO RESULTS DOC: HCPCS | Performed by: INTERNAL MEDICINE

## 2022-02-14 PROCEDURE — 80047 BASIC METABLC PNL IONIZED CA: CPT

## 2022-02-14 PROCEDURE — 1036F TOBACCO NON-USER: CPT | Performed by: INTERNAL MEDICINE

## 2022-02-14 PROCEDURE — 4040F PNEUMOC VAC/ADMIN/RCVD: CPT | Performed by: INTERNAL MEDICINE

## 2022-02-14 PROCEDURE — 1090F PRES/ABSN URINE INCON ASSESS: CPT | Performed by: INTERNAL MEDICINE

## 2022-02-14 PROCEDURE — 99213 OFFICE O/P EST LOW 20 MIN: CPT | Performed by: INTERNAL MEDICINE

## 2022-02-14 PROCEDURE — G8427 DOCREV CUR MEDS BY ELIG CLIN: HCPCS | Performed by: INTERNAL MEDICINE

## 2022-02-14 PROCEDURE — G8417 CALC BMI ABV UP PARAM F/U: HCPCS | Performed by: INTERNAL MEDICINE

## 2022-02-14 PROCEDURE — 99211 OFF/OP EST MAY X REQ PHY/QHP: CPT

## 2022-02-14 PROCEDURE — 80076 HEPATIC FUNCTION PANEL: CPT

## 2022-02-14 PROCEDURE — 1123F ACP DISCUSS/DSCN MKR DOCD: CPT | Performed by: INTERNAL MEDICINE

## 2022-02-14 PROCEDURE — G8484 FLU IMMUNIZE NO ADMIN: HCPCS | Performed by: INTERNAL MEDICINE

## 2022-02-14 PROCEDURE — 85025 COMPLETE CBC W/AUTO DIFF WBC: CPT

## 2022-04-04 ENCOUNTER — TELEPHONE (OUTPATIENT)
Dept: ONCOLOGY | Age: 77
End: 2022-04-04

## 2022-04-05 ENCOUNTER — TELEPHONE (OUTPATIENT)
Dept: ONCOLOGY | Age: 77
End: 2022-04-05

## 2022-04-05 NOTE — TELEPHONE ENCOUNTER
Spoke with patient and she missed her appt on 4/4/22 and will not be rescheduling due to her being in a nursing home permently

## 2022-07-26 ENCOUNTER — INITIAL CONSULT (OUTPATIENT)
Dept: NEUROLOGY | Age: 77
End: 2022-07-26
Payer: MEDICARE

## 2022-07-26 VITALS
HEIGHT: 64 IN | BODY MASS INDEX: 32.61 KG/M2 | SYSTOLIC BLOOD PRESSURE: 120 MMHG | OXYGEN SATURATION: 84 % | HEART RATE: 91 BPM | DIASTOLIC BLOOD PRESSURE: 68 MMHG

## 2022-07-26 DIAGNOSIS — R29.898 BILATERAL LEG WEAKNESS: Primary | ICD-10-CM

## 2022-07-26 DIAGNOSIS — R29.898 BILATERAL ARM WEAKNESS: ICD-10-CM

## 2022-07-26 PROCEDURE — G8428 CUR MEDS NOT DOCUMENT: HCPCS | Performed by: PSYCHIATRY & NEUROLOGY

## 2022-07-26 PROCEDURE — 99205 OFFICE O/P NEW HI 60 MIN: CPT | Performed by: PSYCHIATRY & NEUROLOGY

## 2022-07-26 PROCEDURE — 1036F TOBACCO NON-USER: CPT | Performed by: PSYCHIATRY & NEUROLOGY

## 2022-07-26 PROCEDURE — 1090F PRES/ABSN URINE INCON ASSESS: CPT | Performed by: PSYCHIATRY & NEUROLOGY

## 2022-07-26 PROCEDURE — G8400 PT W/DXA NO RESULTS DOC: HCPCS | Performed by: PSYCHIATRY & NEUROLOGY

## 2022-07-26 PROCEDURE — 1123F ACP DISCUSS/DSCN MKR DOCD: CPT | Performed by: PSYCHIATRY & NEUROLOGY

## 2022-07-26 PROCEDURE — G8417 CALC BMI ABV UP PARAM F/U: HCPCS | Performed by: PSYCHIATRY & NEUROLOGY

## 2022-07-26 RX ORDER — ESCITALOPRAM OXALATE 10 MG/1
TABLET ORAL
COMMUNITY
Start: 2022-05-11

## 2022-07-26 RX ORDER — CALCIUM CARBONATE/VITAMIN D3 500-15 MCG
TABLET ORAL
COMMUNITY

## 2022-07-26 RX ORDER — BUDESONIDE, GLYCOPYRROLATE, AND FORMOTEROL FUMARATE 160; 9; 4.8 UG/1; UG/1; UG/1
AEROSOL, METERED RESPIRATORY (INHALATION)
COMMUNITY
Start: 2022-07-02

## 2022-07-26 RX ORDER — DIVALPROEX SODIUM 125 MG/1
TABLET, DELAYED RELEASE ORAL
COMMUNITY
Start: 2022-07-09

## 2022-07-26 RX ORDER — TRAMADOL HYDROCHLORIDE 50 MG/1
TABLET ORAL
COMMUNITY
Start: 2022-05-20

## 2022-07-26 RX ORDER — LINACLOTIDE 72 UG/1
CAPSULE, GELATIN COATED ORAL
COMMUNITY
Start: 2022-06-26

## 2022-07-26 NOTE — PATIENT INSTRUCTIONS
MRI cervical spine WO contrast  MRI lumbar spine WO contrast  CK  Sed rate  Aldolase  Serum Lead  Call with any new symptoms or concerns.    Follow up after testing is completed

## 2022-08-07 NOTE — PROGRESS NOTES
Chief Complaint   Patient presents with    Consultation     Bilateral leg weakness     Haresh Cunningham is a 68 y.o. female who presents today for evaluation of bilateral leg weakness and numbness since 2016 that has progressively gotten worse. Her symptoms started with her bilateral lower extremities. She also has weakness and numbness in her hands. She is wheelchair bound. She does have urinary incontinence. She has neck pain. She has back pain, she has undergone 2 failed spinal cord cord stimulators. She has pain pump in place. She follows with oncology re: history of lung cancer. She  denies chest pain. No shortness of breath. No vision changes. No dysphagia. No fever. No rash. No weight loss. History provided by patient. Past Medical History:   Diagnosis Date    Arthritis     Chronic back pain     Emphysema of lung ([address]     Hyperlipidemia        Patient Active Problem List   Diagnosis    Hearing loss    Cerumen debris on tympanic membrane    Malignant neoplasm of upper lobe of right lung (HCC)       Allergies   Allergen Reactions    Motrin [Ibuprofen] Itching       Current Outpatient Medications   Medication Sig Dispense Refill    BREZTRI AEROSPHERE 160-9-4.8 MCG/ACT AERO       divalproex (DEPAKOTE) 125 MG DR tablet       escitalopram (LEXAPRO) 10 MG tablet       LINZESS 72 MCG CAPS capsule       traMADol (ULTRAM) 50 MG tablet       mineral oil-hydrophilic petrolatum (AQUAPHOR) ointment Apply topically as needed for Dry Skin Apply topically as needed.       Multiple Vitamin (MULTI-DAY VITAMINS PO) Take by mouth      Calcium Carb-Cholecalciferol (OS-CAS EXTRA D3) 500-600 MG-UNIT TABS Take by mouth      cyclobenzaprine (FLEXERIL) 10 MG tablet Take 10 mg by mouth nightly      SENNA PO Take by mouth three times daily      albuterol (PROVENTIL HFA;VENTOLIN HFA) 108 (90 BASE) MCG/ACT inhaler Inhale 2 puffs into the lungs nightly      simvastatin (ZOCOR) 20 MG tablet Take 40 mg by mouth nightly HYDROmorphone (DILAUDID) Infuse intravenously continuous. Per pain pump implanted Right buttock - refill every 2.5 month - pain clinic [facility] (Patient not taking: Reported on 7/26/2022)      Calcium Carbonate-Vit D-Min (CALCIUM 1200 PO) Take by mouth daily (Patient not taking: Reported on 7/26/2022)       No current facility-administered medications for this visit. Social History     Socioeconomic History    Marital status:      Spouse name: Aubree Lynn    Number of children: 2    Years of education: None    Highest education level: None   Tobacco Use    Smoking status: Former     Packs/day: 2.00     Years: 52.00     Pack years: 104.00     Types: Cigarettes    Smokeless tobacco: Never    Tobacco comments:     currently few puffs cigarette 2//day   Vaping Use    Vaping Use: Never used   Substance and Sexual Activity    Alcohol use: No    Drug use: Never    Sexual activity: Not Currently       Family History   Problem Relation Age of Onset    Diabetes Other     High Blood Pressure Other     Heart Disease Other     Cancer Other     Diabetes Mother     Diabetes Sister     Heart Disease Sister     Heart Attack Sister     Breast Cancer Maternal Grandmother     Asthma Brother          I reviewed the past medical history, allergies, medications, social history and family history.    Review of Systems   All systems reviewed, and are all negative, except what is mentioned in HPI      Vitals:    07/26/22 1207 07/26/22 1219   BP: 98/62 120/68   Site: Left Upper Arm Right Upper Arm   Position: Sitting Sitting   Cuff Size: Large Adult Medium Adult   Pulse: 91    SpO2: (!) 84%    Height: 5' 4\" (1.626 m)        Physical Examination:  General appearance - alert, well appearing, and in no distress, oriented to person, place, and time and over weight  Mental status- Level of Alertness: awake  Orientation: person, place, time  Memory: normal  Fund of Knowledge: normal  Attention/Concentration: normal  Language: normal. Mood is normal.   Neck - supple, no significant adenopathy, carotids upstroke normal bilaterally. There is no axillary lymphadenopathy. There is no carotid bruit. No neck lymphadenopathy. No thyroid enlargement   Neurological -   Cranial [id number]:.   Cranial nerve II: Normal   Cranial nerve III: Pupils: equal, round, reactive to light  Cranial nerves III, IV, VI: Extraocular Movements: intact   Cranial nerve V: Facial sensation: intact   Cranial nerve VII:Facial strength: intact   Cranial nerve VIII: Hearing: intact   Cranial nerve IX: Palate Elevation intact bilaterally  Cranial nerve XI: Shoulder shrug intact bilaterally  Cranial nerve XII: Tongue midline   neck supple without rigidity, there is  limitation of range of motion of the neck bilaterally, worse to the left  DTR's are Intact distal and symmetric  Babinski sign negative  Motor exam is 5/5 in the upper and lower extremities. She has weakness of her hand  bilaterally (variable effort given on exam) Normal muscle tone. There is no muscle atrophy. Sensory is intact for light touch  Coordination: finger to nose,  intact  Gait and station not tested, she is in wheelchair  Abnormal movement none, vibration absent distally  Skin - warm, dry to touch, normal coloration, no rashes, no suspicious skin lesions  Superficial temporal artery pulses are normal.   There is no resting tremor, no pin rolling, no bradykinesia, no Hypohonia, normal blink rate. Musculoskeletal: Has no hand arthritis, no limitation of ROM in any of the four extremities, except bilateral lower extremities. There is no leg edema. The Heart was regular in rate and rhythm. No heart murmur  Chest Clear, with  good effort. Abdomen soft, intact bowel sounds. We reviewed the patient records from referring provider and available information in the EHR       ASSESSMENT:      Diagnosis Orders   1. Bilateral leg weakness        2.  Bilateral arm weakness           This is a 68year-old female who presents for evaluation for bilateral leg weakness and numbness since 2016 that is progressively gotten worse. Symptoms started in her bilateral lower extremities and is slowly progressed to involve her upper extremities. She is wheelchair-bound and does have urinary incontinence. She does have neck pain and has undergone 2 failed spinal cord stimulator placements. She now has a pain pump in place. The patient was counseled about her symptoms and work up recommended. Need to evaluate for spinal stenosis causing her symptoms, vs muscle condition. We will arrange for her to undergo an MRI of the cervical spine without contrast and lumbar spine without contrast to evaluate for organic causes of her symptoms. We will also obtain lab work checking for inflammation, muscle enzymes, and vitamin levels. If her pain pump is not MR-compatible, we will consider CT cervical and lumbar spine to be done as a next step. After detailed discussion with patient we agreed on the following plan. Plan    MRI cervical spine WO contrast  MRI lumbar spine WO contrast  CK  Sed rate  Aldolase  Serum Lead  Vitamin B12, folate  Vitamin B6 level  Consider CT cervical and lumbar spine, if pain pump is not compatible with MRI. Call with any new symptoms or concerns.    Follow up after testing is completed    Total time 67 min    Perlita Carpenter MD

## 2022-08-18 ENCOUNTER — TELEPHONE (OUTPATIENT)
Dept: NEUROLOGY | Age: 77
End: 2022-08-18

## 2022-08-18 DIAGNOSIS — R29.898 BILATERAL LEG WEAKNESS: Primary | ICD-10-CM

## 2022-08-18 DIAGNOSIS — R29.898 BILATERAL ARM WEAKNESS: ICD-10-CM

## 2022-08-18 NOTE — TELEPHONE ENCOUNTER
Received voice message stating patient is having problems getting her pain pump switched to MRI mode. Requesting CT in place of MRI. Please advise. Thank you.        Call back [phone]  Fax: [phone]

## 2022-08-31 ENCOUNTER — HOSPITAL ENCOUNTER (OUTPATIENT)
Dept: CT IMAGING | Age: 77
Discharge: HOME | End: 2022-08-31
Payer: MEDICARE

## 2022-08-31 DIAGNOSIS — R29.898 BILATERAL LEG WEAKNESS: ICD-10-CM

## 2022-08-31 DIAGNOSIS — R29.898 BILATERAL ARM WEAKNESS: ICD-10-CM

## 2022-08-31 PROCEDURE — 72125 CT NECK SPINE W/O DYE: CPT

## 2022-08-31 PROCEDURE — 72131 CT LUMBAR SPINE W/O DYE: CPT

## 2023-08-18 DIAGNOSIS — R29.898 BILATERAL LEG WEAKNESS: Primary | ICD-10-CM

## 2023-08-18 DIAGNOSIS — R29.898 BILATERAL ARM WEAKNESS: ICD-10-CM

## 2023-09-06 ENCOUNTER — HOSPITAL ENCOUNTER (OUTPATIENT)
Dept: MRI IMAGING | Age: 78
Discharge: HOME | End: 2023-09-06
Payer: MEDICARE

## 2023-09-06 DIAGNOSIS — R29.898 BILATERAL ARM WEAKNESS: ICD-10-CM

## 2023-09-06 DIAGNOSIS — R29.898 BILATERAL LEG WEAKNESS: ICD-10-CM

## 2023-09-06 PROCEDURE — 72141 MRI NECK SPINE W/O DYE: CPT

## 2023-09-06 PROCEDURE — 72148 MRI LUMBAR SPINE W/O DYE: CPT

## 2023-12-04 ENCOUNTER — OFFICE VISIT (OUTPATIENT)
Dept: NEUROSURGERY | Age: 78
End: 2023-12-04
Payer: MEDICARE

## 2023-12-04 VITALS
HEIGHT: 64 IN | WEIGHT: 188 LBS | HEART RATE: 95 BPM | DIASTOLIC BLOOD PRESSURE: 77 MMHG | SYSTOLIC BLOOD PRESSURE: 123 MMHG | BODY MASS INDEX: 32.1 KG/M2

## 2023-12-04 DIAGNOSIS — R25.2 SPASTICITY: Primary | ICD-10-CM

## 2023-12-04 PROCEDURE — 1090F PRES/ABSN URINE INCON ASSESS: CPT | Performed by: PHYSICIAN ASSISTANT

## 2023-12-04 PROCEDURE — G8417 CALC BMI ABV UP PARAM F/U: HCPCS | Performed by: PHYSICIAN ASSISTANT

## 2023-12-04 PROCEDURE — 1123F ACP DISCUSS/DSCN MKR DOCD: CPT | Performed by: PHYSICIAN ASSISTANT

## 2023-12-04 PROCEDURE — G8428 CUR MEDS NOT DOCUMENT: HCPCS | Performed by: PHYSICIAN ASSISTANT

## 2023-12-04 PROCEDURE — G8484 FLU IMMUNIZE NO ADMIN: HCPCS | Performed by: PHYSICIAN ASSISTANT

## 2023-12-04 PROCEDURE — 99204 OFFICE O/P NEW MOD 45 MIN: CPT | Performed by: PHYSICIAN ASSISTANT

## 2023-12-04 PROCEDURE — G8400 PT W/DXA NO RESULTS DOC: HCPCS | Performed by: PHYSICIAN ASSISTANT

## 2023-12-04 PROCEDURE — 1036F TOBACCO NON-USER: CPT | Performed by: PHYSICIAN ASSISTANT

## 2023-12-04 ASSESSMENT — ENCOUNTER SYMPTOMS
APNEA: 0
BACK PAIN: 1
SHORTNESS OF BREATH: 0
CHEST TIGHTNESS: 0

## 2023-12-04 NOTE — PROGRESS NOTES
[facility]  [address] [place] [address] [address]  Dept: [phone]  Dept Fax: [phone]      Patient Name:  Dion Aguirre [facility]  Visit Date:  12/4/2023    HPI:     [facility] is a 66 y.o. female that presents today at [facility] for evaluation of the following: A referral to our service from Dr. Nino/neurologist for evaluation of signs and symptoms consistent with spasticity. Chief Complaint   Patient presents with    New Patient     New patient/ spasticity        HPI     Mrs. Chino is a pleasant, active 71-year-old female, a former smoker tobacco with a prior 2 pack day 104-pack-year history who denies smokeless tobacco or vaping product use and denies alcohol use with a medical history significant for arthritis, chronic back pain, emphysema, and hyperlipidemia along with a surgical history absent spine or brain surgery. She arrives as a referral from Dr. Nino/neurologist for evaluation of symptoms consistent with spasticity. 2 images accompany today's appointment in the form of MRIs of the lumbar and cervical spine imaged in September. The MRI of the cervical spine imaged on 9/6/2023 reveals straightening of the normal cervical lordosis with superimposed degenerative changes considered age-appropriate. Mild canal stenosis with no cord compression and mild bilateral foraminal stenosis at C5-6 is noted with additional mild findings at C3-4. Shallow disc herniation at C5-6 does not contribute significantly to canal or foraminal stenosis. A lumbar spine MRI also imaged in September reveals degenerative changes involving the SI joints with mild to moderate bilateral foraminal stenosis from L2-L4 along with a mention of a dilated abdominal aorta.   Of note she has been seen in the past by [facility], as early as August 2022, where she was evaluated for bilateral lower extremity